# Patient Record
Sex: FEMALE | Race: WHITE | NOT HISPANIC OR LATINO | Employment: UNEMPLOYED | URBAN - METROPOLITAN AREA
[De-identification: names, ages, dates, MRNs, and addresses within clinical notes are randomized per-mention and may not be internally consistent; named-entity substitution may affect disease eponyms.]

---

## 2023-12-06 ENCOUNTER — HOSPITAL ENCOUNTER (OUTPATIENT)
Dept: RADIOLOGY | Facility: HOSPITAL | Age: 58
Discharge: HOME | End: 2023-12-06

## 2023-12-06 DIAGNOSIS — Z13.6 ENCOUNTER FOR SCREENING FOR CARDIOVASCULAR DISORDERS: ICD-10-CM

## 2023-12-06 PROCEDURE — 75571 CT HRT W/O DYE W/CA TEST: CPT | Mod: LIO

## 2024-08-19 ENCOUNTER — HOSPITAL ENCOUNTER (OUTPATIENT)
Dept: RADIOLOGY | Facility: CLINIC | Age: 59
Discharge: HOME | End: 2024-08-19
Payer: COMMERCIAL

## 2024-08-19 VITALS — BODY MASS INDEX: 28.52 KG/M2 | HEIGHT: 62 IN | WEIGHT: 155 LBS

## 2024-08-19 DIAGNOSIS — Z12.31 ENCOUNTER FOR SCREENING MAMMOGRAM FOR MALIGNANT NEOPLASM OF BREAST: ICD-10-CM

## 2024-08-19 PROCEDURE — 77067 SCR MAMMO BI INCL CAD: CPT

## 2024-08-19 PROCEDURE — 77067 SCR MAMMO BI INCL CAD: CPT | Performed by: RADIOLOGY

## 2024-08-19 PROCEDURE — 77063 BREAST TOMOSYNTHESIS BI: CPT | Performed by: RADIOLOGY

## 2025-07-29 ENCOUNTER — APPOINTMENT (OUTPATIENT)
Dept: OBSTETRICS AND GYNECOLOGY | Facility: CLINIC | Age: 60
End: 2025-07-29
Payer: COMMERCIAL

## 2025-07-29 VITALS
WEIGHT: 156 LBS | DIASTOLIC BLOOD PRESSURE: 87 MMHG | BODY MASS INDEX: 28.71 KG/M2 | HEIGHT: 62 IN | SYSTOLIC BLOOD PRESSURE: 129 MMHG

## 2025-07-29 DIAGNOSIS — Z01.419 WELL WOMAN EXAM WITH ROUTINE GYNECOLOGICAL EXAM: Primary | ICD-10-CM

## 2025-07-29 DIAGNOSIS — N95.1 SYMPTOMATIC MENOPAUSAL OR FEMALE CLIMACTERIC STATES: ICD-10-CM

## 2025-07-29 PROCEDURE — 99386 PREV VISIT NEW AGE 40-64: CPT | Performed by: NURSE PRACTITIONER

## 2025-07-29 PROCEDURE — 3008F BODY MASS INDEX DOCD: CPT | Performed by: NURSE PRACTITIONER

## 2025-07-29 PROCEDURE — 99213 OFFICE O/P EST LOW 20 MIN: CPT | Performed by: NURSE PRACTITIONER

## 2025-07-29 RX ORDER — ESTRADIOL/NORETHINDRONE ACETATE TRANSDERMAL SYSTEM .05; .25 MG/D; MG/D
1 PATCH, EXTENDED RELEASE TRANSDERMAL 2 TIMES WEEKLY
Qty: 8 PATCH | Refills: 12 | Status: SHIPPED | OUTPATIENT
Start: 2025-07-31 | End: 2026-07-31

## 2025-07-29 NOTE — PROGRESS NOTES
HPI: Keara Villa is a 60 y.o. year old  presenting for annual gyn exam  Pt last seen for annual exam     Current concerns: menopause symptoms   Did use paxil which did help, but hot flashes worse again, and no sex drive  Hot flashes at least twice an hour, not affecting work  Hard to lose weight, plays pick[employer]agon Inside ball and going to gym 4 times week, working on diet, eating healthy; only social drinker  Sleep is controlled, now on 30mg Paxil working well at night and also controlling mood.  Has tried vaginal premarin cream 3 days     , has adult children and granchildren ranging 11yo to 4yo. 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 (Menstrual status: Menopausal).; Menopause at 57yo, no HRT  Abnormal bleeding: no  Hot flashes/night sweats: night sweats gone and hot flashes   Sexually active: not often  Vaginal dryness: yes, lubricants not helping   STI concerns: no  Last mammogram: 2024 nml   Last pap:  nml, HPV neg  History of abnormal pap: no  Other gyn history:  x2, fibroid uterus; right breast biopsy cyst aspiration, cytology nml   OB History        2    Para   2    Term   2            AB        Living           SAB        IAB        Ectopic        Multiple        Live Births                  Past Medical History:  No date: Abnormal findings on diagnostic imaging of other abdominal   regions, including retroperitoneum      Comment:  Abdominal ultrasound, abnormal  1970: Asthma  No date: Encounter for gynecological examination (general) (routine)   without abnormal findings      Comment:  Pap smear, as part of routine gynecological examination  2005: Fibrocystic breast  2000: Fibroid  No date: Other constipation      Comment:  Chronic constipation  No date: Personal history of diseases of the blood and blood-forming   organs and certain disorders involving the immune mechanism      Comment:  History of anemia  No date: Personal  history of other diseases of the nervous system and   sense organs      Comment:  History of migraine  No date: Personal history of other diseases of the respiratory system      Comment:  Personal history of asthma  No date: Personal history of other infectious and parasitic diseases      Comment:  History of varicella  No date: Personal history of other medical treatment      Comment:  H/O pelvic ultrasound  No date: Personal history of other medical treatment      Comment:  H/O screening mammography   Past Surgical History:  08/15/2017: BREAST BIOPSY      Comment:  Biopsy Breast Open  8/15/2017: BREAST LUMPECTOMY  09/23/2014: BREAST SURGERY      Comment:  Breast Surgery  08/03/2018: CHOLECYSTECTOMY      Comment:  Cholecystectomy Laparoscopic  08/15/2017: COLONOSCOPY      Comment:  Colonoscopy (Fiberoptic)  09/23/2014: SINUS SURGERY      Comment:  Sinus Surgery   Social History    Socioeconomic History      Marital status:       Spouse name: Not on file      Number of children: Not on file      Years of education: Not on file      Highest education level: Not on file    Occupational History      Not on file    Tobacco Use      Smoking status: Some Days        Packs/day: 0.25        Years: 0.3 packs/day for 1.3 years (0.3 ttl pk-yrs)        Types: Cigarettes        Start date: 4/11/2024      Smokeless tobacco: Never    Substance and Sexual Activity      Alcohol use: Not Currently        Alcohol/week: 2.0 standard drinks of alcohol        Types: 2 Standard drinks or equivalent per week      Drug use: Never      Sexual activity: Not Currently        Partners: Male        Birth control/protection: None    Other Topics      Concerns:        Not on file    Social History Narrative      Not on file    Social Drivers of Health  Financial Resource Strain: Not on file  Food Insecurity: Not on file  Transportation Needs: Not on file  Physical Activity: Not on file  Stress: Not on file  Social Connections: Not on  "file  Intimate Partner Violence: Not on file  Housing Stability: Not on file   Review of patient's family history indicates:  Problem: Colon cancer      Relation: Paternal Grandfather          Name: Jack Mentzer              Age of Onset: (Not Specified)  Problem: Drug abuse      Relation: Brother          Name: John Mentzer              Age of Onset: 20 - 29  Problem: Hyperlipidemia      Relation: Mother          Name: Joyce Mentzer              Age of Onset: (Not Specified)  Problem: Hearing loss      Relation: Mother          Name: Joyce Mentzer              Age of Onset: 20 - 29  Problem: Heart disease      Relation: Father          Name: John Mentzer jr              Age of Onset: 50 - 59  Problem: Hypertension      Relation: Maternal Grandmother          Name: Twan Fonseca              Age of Onset: 40 - 49     No current outpatient medications on file.          REVIEW OF SYSTEMS:  Breast. denies masses, nipple discharge  : denies dysuria, hematuria + stress incontinence history  Gl: denies change in bowel habits, blood in stools      PHYSICAL EXAM:  Vitals: /87   Ht 1.575 m (5' 2\")   Wt 70.8 kg (156 lb)   BMI 28.53 kg/m²   Gen: well appearing woman in NAD  HEENT: normocephalic, thyroid not enlarged, no lymphadenopathy  CV: no m/r/g  Chest: CTAB, no w/r/r  Breast: normal tissue bilaterally without masses, skin changes, lymphadenopathy   Abdomen: soft, nontender, no masses/hernias, liver and spleen not enlarged   Pelvic:  - external genitalia: normal  -vagina: normal; atrophic changes noted  - cervix: normal  - uterus: normal size, nontender  - adnexa: no palpable masses or tenderness    ASSESSMENT/PLAN :    1) Health maintenance, Well-woman exam.  Pap/HPV up to date   Mammogram ordered  Nutrition, exercise and routine health maintenance exams reviewed.  Calcium/Vitamin D supplementation information provided   Smoking cessation: +cigarette smoker, interested  2) Postmenopausal: +concerns, " symptomatic with hot flashes bothersome, interested in HRT.  -Symptomatic menopause: discussed nature of condition and options/risks/benefits for treatment including conservative measures, non-hormonal alternative, and hormone therapy. Pt has the following contraindications to hormone therapy: smoker but interested in cessation again. Recommend estrogen/progesterone therapy, and patient is agreeable. Increased risk of heart disease, VTE, stroke, and breast cancer were discussed at length, and she accepts these risks. Start combipatch 0.05-0.25mg.  Advised to call in 2-4 weeks if symptoms not improved to adjust dose. Rec f/u 3 months for HRT  3) STD screening: declines, no concerns  4) Follow up one year or sooner as needed

## 2025-09-25 ENCOUNTER — APPOINTMENT (OUTPATIENT)
Dept: ALLERGY | Facility: CLINIC | Age: 60
End: 2025-09-25
Payer: COMMERCIAL

## 2025-10-28 ENCOUNTER — APPOINTMENT (OUTPATIENT)
Dept: OBSTETRICS AND GYNECOLOGY | Facility: CLINIC | Age: 60
End: 2025-10-28
Payer: COMMERCIAL